# Patient Record
Sex: FEMALE | Race: WHITE | URBAN - METROPOLITAN AREA
[De-identification: names, ages, dates, MRNs, and addresses within clinical notes are randomized per-mention and may not be internally consistent; named-entity substitution may affect disease eponyms.]

---

## 2022-02-08 ENCOUNTER — WEB ENCOUNTER (OUTPATIENT)
Dept: URBAN - METROPOLITAN AREA CLINIC 40 | Facility: CLINIC | Age: 37
End: 2022-02-08

## 2022-02-08 ENCOUNTER — OFFICE VISIT (OUTPATIENT)
Dept: URBAN - METROPOLITAN AREA CLINIC 40 | Facility: CLINIC | Age: 37
End: 2022-02-08
Payer: COMMERCIAL

## 2022-02-08 ENCOUNTER — DASHBOARD ENCOUNTERS (OUTPATIENT)
Age: 37
End: 2022-02-08

## 2022-02-08 VITALS
HEIGHT: 61 IN | DIASTOLIC BLOOD PRESSURE: 84 MMHG | WEIGHT: 160 LBS | TEMPERATURE: 98.6 F | HEART RATE: 93 BPM | BODY MASS INDEX: 30.21 KG/M2 | SYSTOLIC BLOOD PRESSURE: 130 MMHG

## 2022-02-08 DIAGNOSIS — R10.12 LUQ PAIN: ICD-10-CM

## 2022-02-08 DIAGNOSIS — K59.04 CHRONIC IDIOPATHIC CONSTIPATION: ICD-10-CM

## 2022-02-08 DIAGNOSIS — K21.9 GASTROESOPHAGEAL REFLUX DISEASE, UNSPECIFIED WHETHER ESOPHAGITIS PRESENT: ICD-10-CM

## 2022-02-08 DIAGNOSIS — R11.0 NAUSEA: ICD-10-CM

## 2022-02-08 PROBLEM — 235595009: Status: ACTIVE | Noted: 2022-02-08

## 2022-02-08 PROBLEM — 422587007: Status: ACTIVE | Noted: 2022-02-08

## 2022-02-08 PROBLEM — 82934008: Status: ACTIVE | Noted: 2022-02-08

## 2022-02-08 PROCEDURE — 99204 OFFICE O/P NEW MOD 45 MIN: CPT | Performed by: STUDENT IN AN ORGANIZED HEALTH CARE EDUCATION/TRAINING PROGRAM

## 2022-02-08 RX ORDER — LINACLOTIDE 290 UG/1
1 CAPSULE AT LEAST 30 MINUTES BEFORE THE FIRST MEAL OF THE DAY ON AN EMPTY STOMACH CAPSULE, GELATIN COATED ORAL ONCE A DAY
Qty: 90 | Refills: 3 | OUTPATIENT
Start: 2022-02-08 | End: 2023-02-03

## 2022-02-08 RX ORDER — OMEPRAZOLE 20 MG/1
1 CAPSULE 30 MINUTES BEFORE MORNING MEAL CAPSULE, DELAYED RELEASE ORAL ONCE A DAY
Qty: 30 | Refills: 2 | OUTPATIENT
Start: 2022-02-08

## 2022-02-08 NOTE — HPI-TODAY'S VISIT:
37-year-old female [place] Comes in for evaluation of abdomen pain, chronic in nature, ongoing for last many years, mostly in left upper quadrant, no radiation, constant in nature, worsens after meal intake, does not improves after bowel movement. Also reports chronic nausea but no vomiting.  Daily reflux symptoms for which patient is currently not taking any PPI.  Tums only improved symptoms partially.  No dysphagia or odynophagia.  Severe chronic constipation.  Moves her bowels once a week.  No blood in the stool.  Reports straining and hard stools. Has tried over-the-counter laxatives and herbal medications with poor response so far. Reports  Cancer of colon in her cousin at age 22.  She also had colonoscopy few years back which was negative for any obstructive or mass lesions. Never had EGD before. Very rare use of NSAID. No alcohol or smoking. Overall appetite remains good and denies any unintentional weight loss.

## 2022-02-09 LAB
A/G RATIO: 1.6
ALBUMIN: 4.7
ALKALINE PHOSPHATASE: 92
AST (SGOT): 22
BASO (ABSOLUTE): 0
BASOS: 0
BILIRUBIN, TOTAL: 0.7
BUN/CREATININE RATIO: 17
BUN: 11
CALCIUM: 9.6
CARBON DIOXIDE, TOTAL: 23
CHLORIDE: 99
CREATININE: 0.66
EGFR IF AFRICN AM: 131
EGFR IF NONAFRICN AM: 114
EOS (ABSOLUTE): 0.2
EOS: 3
GLOBULIN, TOTAL: 3
GLUCOSE: 105
HEMATOCRIT: 43.6
HEMATOLOGY COMMENTS:: (no result)
HEMOGLOBIN: 14.4
IMMATURE CELLS: (no result)
IMMATURE GRANS (ABS): 0
IMMATURE GRANULOCYTES: 0
LIPASE: 55
LYMPHS (ABSOLUTE): 2.4
LYMPHS: 32
MCH: 28.6
MCHC: 33
MCV: 87
MONOCYTES(ABSOLUTE): 0.5
MONOCYTES: 7
NEUTROPHILS (ABSOLUTE): 4.3
NEUTROPHILS: 58
NRBC: (no result)
PLATELETS: 275
POTASSIUM: 4.1
PROTEIN, TOTAL: 7.7
RBC: 5.03
RDW: 13.1
SODIUM: 137
TSH: 0.64
WBC: 7.4

## 2022-02-22 ENCOUNTER — OFFICE VISIT (OUTPATIENT)
Dept: URBAN - METROPOLITAN AREA CLINIC 73 | Facility: CLINIC | Age: 37
End: 2022-02-22
Payer: COMMERCIAL

## 2022-02-22 DIAGNOSIS — K82.4 GALLBLADDER POLYP: ICD-10-CM

## 2022-02-22 DIAGNOSIS — R10.84 ABDOMINAL PAIN, GENERALIZED: ICD-10-CM

## 2022-02-22 DIAGNOSIS — K76.0 FATTY LIVER: ICD-10-CM

## 2022-02-22 PROCEDURE — 76700 US EXAM ABDOM COMPLETE: CPT | Performed by: STUDENT IN AN ORGANIZED HEALTH CARE EDUCATION/TRAINING PROGRAM

## 2022-02-22 RX ORDER — LINACLOTIDE 290 UG/1
1 CAPSULE AT LEAST 30 MINUTES BEFORE THE FIRST MEAL OF THE DAY ON AN EMPTY STOMACH CAPSULE, GELATIN COATED ORAL ONCE A DAY
Qty: 90 | Refills: 3 | Status: ACTIVE | COMMUNITY
Start: 2022-02-08 | End: 2023-02-03

## 2022-02-22 RX ORDER — OMEPRAZOLE 20 MG/1
1 CAPSULE 30 MINUTES BEFORE MORNING MEAL CAPSULE, DELAYED RELEASE ORAL ONCE A DAY
Qty: 30 | Refills: 2 | Status: ACTIVE | COMMUNITY
Start: 2022-02-08

## 2022-03-24 ENCOUNTER — OFFICE VISIT (OUTPATIENT)
Dept: URBAN - METROPOLITAN AREA SURGERY CENTER 30 | Facility: SURGERY CENTER | Age: 37
End: 2022-03-24

## 2022-03-24 RX ORDER — OMEPRAZOLE 20 MG/1
1 CAPSULE 30 MINUTES BEFORE MORNING MEAL CAPSULE, DELAYED RELEASE ORAL ONCE A DAY
Qty: 30 | Refills: 2 | Status: ACTIVE | COMMUNITY
Start: 2022-02-08

## 2022-03-24 RX ORDER — LINACLOTIDE 290 UG/1
1 CAPSULE AT LEAST 30 MINUTES BEFORE THE FIRST MEAL OF THE DAY ON AN EMPTY STOMACH CAPSULE, GELATIN COATED ORAL ONCE A DAY
Qty: 90 | Refills: 3 | Status: ACTIVE | COMMUNITY
Start: 2022-02-08 | End: 2023-02-03

## 2024-09-25 PROBLEM — 197321007: Status: ACTIVE | Noted: 2024-09-25

## 2024-09-25 PROBLEM — 197433003: Status: ACTIVE | Noted: 2024-09-25

## 2024-10-03 ENCOUNTER — OFFICE VISIT (OUTPATIENT)
Dept: URBAN - METROPOLITAN AREA CLINIC 74 | Facility: CLINIC | Age: 39
End: 2024-10-03
Payer: COMMERCIAL

## 2024-10-03 ENCOUNTER — LAB OUTSIDE AN ENCOUNTER (OUTPATIENT)
Dept: URBAN - METROPOLITAN AREA CLINIC 74 | Facility: CLINIC | Age: 39
End: 2024-10-03

## 2024-10-03 VITALS
BODY MASS INDEX: 28.13 KG/M2 | SYSTOLIC BLOOD PRESSURE: 118 MMHG | HEIGHT: 61 IN | HEART RATE: 59 BPM | OXYGEN SATURATION: 98 % | WEIGHT: 149 LBS | DIASTOLIC BLOOD PRESSURE: 70 MMHG

## 2024-10-03 DIAGNOSIS — K82.4 GALLBLADDER POLYP: ICD-10-CM

## 2024-10-03 DIAGNOSIS — A04.8 H. PYLORI INFECTION: ICD-10-CM

## 2024-10-03 DIAGNOSIS — R14.3 FLATULENCE: ICD-10-CM

## 2024-10-03 DIAGNOSIS — Z87.42 HISTORY OF ENDOMETRIOSIS: ICD-10-CM

## 2024-10-03 DIAGNOSIS — Z90.710 HISTORY OF HYSTERECTOMY: ICD-10-CM

## 2024-10-03 DIAGNOSIS — R14.1 GAS PAIN: ICD-10-CM

## 2024-10-03 DIAGNOSIS — K76.0 FATTY LIVER: ICD-10-CM

## 2024-10-03 DIAGNOSIS — R10.33 PERIUMBILICAL ABDOMINAL PAIN: ICD-10-CM

## 2024-10-03 DIAGNOSIS — R12 HEARTBURN: ICD-10-CM

## 2024-10-03 DIAGNOSIS — K21.9 GASTROESOPHAGEAL REFLUX DISEASE, UNSPECIFIED WHETHER ESOPHAGITIS PRESENT: ICD-10-CM

## 2024-10-03 DIAGNOSIS — Z87.19 HISTORY OF GASTRITIS: ICD-10-CM

## 2024-10-03 DIAGNOSIS — K59.01 SLOW TRANSIT CONSTIPATION: ICD-10-CM

## 2024-10-03 PROBLEM — 161800001: Status: ACTIVE | Noted: 2024-10-03

## 2024-10-03 PROBLEM — 35298007: Status: ACTIVE | Noted: 2024-10-03

## 2024-10-03 PROBLEM — 691501000119103: Status: ACTIVE | Noted: 2024-10-03

## 2024-10-03 PROBLEM — 45979003: Status: ACTIVE | Noted: 2024-10-03

## 2024-10-03 PROBLEM — 271832001: Status: ACTIVE | Noted: 2024-10-03

## 2024-10-03 PROBLEM — 16331000: Status: ACTIVE | Noted: 2024-10-03

## 2024-10-03 PROBLEM — 29200001000004107: Status: ACTIVE | Noted: 2024-10-03

## 2024-10-03 PROBLEM — 721730009: Status: ACTIVE | Noted: 2024-10-03

## 2024-10-03 PROBLEM — 443503005: Status: ACTIVE | Noted: 2024-10-03

## 2024-10-03 PROCEDURE — 99214 OFFICE O/P EST MOD 30 MIN: CPT | Performed by: INTERNAL MEDICINE

## 2024-10-03 RX ORDER — LINACLOTIDE 145 UG/1
1 CAPSULE AT LEAST 30 MINUTES BEFORE THE FIRST MEAL OF THE DAY ON AN EMPTY STOMACH CAPSULE, GELATIN COATED ORAL ONCE A DAY
Qty: 14 | Refills: 0 | OUTPATIENT
Start: 2024-10-03 | End: 2024-10-17

## 2024-10-03 NOTE — HPI-TODAY'S VISIT:
37-year-old female [place] to [place] Comes in for evaluation of abdomen pain, chronic in nature, ongoing for last many years, mostly in left upper quadrant, no radiation, constant in nature, worsens after meal intake, does not improves after bowel movement. Also reports chronic nausea but no vomiting.  Daily reflux symptoms for which patient is currently not taking any PPI.  Tums only improved symptoms partially.  No dysphagia or odynophagia.  Severe chronic constipation.  Moves her bowels once a week.  No blood in the stool.  Reports straining and hard stools. Has tried over-the-counter laxatives and herbal medications with poor response so far. Reports  Cancer of colon in her cousin at age 22.  She also had colonoscopy few years back which was negative for any obstructive or mass lesions. Never had EGD before. Very rare use of NSAID. No alcohol or smoking. Overall appetite remains good and denies any unintentional weight loss.  Today October 3, 2024 the patient returns for a follow-up visit, the patient was last seen on February 8, 2022 by Dr. Hays with left upper quadrant abdominal pain, gastroesophageal reflux disease, nausea and chronic idiopathic constipation,   At the time the patient stated that the pain was chronic in nature ongoing for many years mostly in the left upper quadrant, did not radiate, constant in nature, worsened after meals, did not improve after bowel movements.  The patient had nausea but no vomiting.  The patient had daily reflux symptoms and was taking Tums, did not take PPIs, improved only partially with Tums, the patient denied having dysphagia or odynophagia.    The patient had severe constipation, had a BM once a week, did strain and had hard stools, there was no rectal bleeding.  The patient did try over-the-counter laxatives and herbal medications with good response.  The patient had a cousin had colon cancer at age 22, the patient did have a colonoscopy years before which was negative.  The patient rarely used nonsteroidal anti-inflammatory drugs.  At the time the patient was advised to get an ultrasound of the abdomen, an EGD, start Linzess, high-fiber diet.  The patient was advised to start omeprazole 20 mg before breakfast and follow antireflux measures and diet.  The patient was advised to get a colonoscopy at age 41.  The ultrasound performed on February 22, 2022 revealed a 6.6 mm gallbladder polyp with no gallstones, no biliary ductal dilation, the liver appeared to be moderately echogenic compatible with fatty liver, no focal lesions identified in the contour was smooth.  The right kidney and left kidney were normal the spleen was normal there was no ascites.  There is no evidence that the patient had an EGD.  Today the patient returns to the office with multiple complaints, the patient states that she has continued to have gastroesophageal reflux and heartburn, denies having any nausea or vomiting, denies having any dysphagia or odynophagia.  While in [facility] [place] did have an EGD and was told that she had an H. pylori infection which was treated with various antibiotics and subsequently had an H. pylori breath test which remained positive.  The patient is currently not taking any medications for H. pylori or acid reflux.  The patient was instructed to start antireflux measures and diet, will be scheduled to have an H. pylori breath test off PPIs and will be scheduled to have an EGD with gastric biopsies.  The patient also complains of chronic constipation, while taking Linzess 72 mcg as prescribed by Dr. Hays was defecating more frequently by 6 stools on the [place] scale, she stopped taking the medication and started to take over-the-counter laxatives, when taking laxatives she gets diarrhea.  The patient states that if she does not take a laxative she will not get any natural desire to defecate, once on the laxative she has no difficulty expelling stool out of the rectum, she does not have to strain.  The patient denies having any rectal bleeding or hematochezia.  We will obtain records from her recent colonoscopy performed in [facility], at the time she was told that the colonoscopy was normal.  The patient recently had a hysterectomy to treat endometriosis, she continues to have periumbilical abdominal pain, dull in nature, pretty persistent, does not radiate, does not improve with defecation, urination, physical activity change in position.  The patient will be advised to get a CT scan of the abdomen and pelvis with contrast as long as her renal function allows it.  We are also getting a transit time with markers.  The patient in the past was diagnosed with a gallbladder polyp measuring 6.6 mm, according to the patient she did have a recent ultrasound which failed to show any abnormalities also performed in [facility], we will get a follow-up right upper quadrant ultrasound.  The patient's endocrinologist in [facility] suspected she had hypothyroidism but there are no records from any evaluation.  We will obtain a TSH and free T4.  The patient will return for a follow-up visit after we complete the evaluation.  In the meantime she will start Linzess 145 mcg to be taken daily samples were provided.

## 2024-10-14 LAB
ABSOLUTE BASOPHILS: 53
ABSOLUTE EOSINOPHILS: 147
ABSOLUTE LYMPHOCYTES: 2552
ABSOLUTE MONOCYTES: 504
ABSOLUTE NEUTROPHILS: 7245
ALBUMIN/GLOBULIN RATIO: 1.5
ALBUMIN: 4.3
ALKALINE PHOSPHATASE: 76
ALT: 30
AST: 17
BASOPHILS: 0.5
BILIRUBIN, TOTAL: 1.2
BUN/CREATININE RATIO: (no result)
C-REACTIVE PROTEIN, QUANT: <3
CALCIUM: 9.4
CARBON DIOXIDE: 24
CHLORIDE: 104
CREATININE: 0.63
EGFR: 116
EOSINOPHILS: 1.4
FIB 4 INDEX: 0.44
GLOBULIN: 2.9
GLUCOSE: 94
H PYLORI BREATH TEST: DETECTED
HEMATOCRIT: 44.4
HEMOGLOBIN: 14.2
LYMPHOCYTES: 24.3
MCH: 28.2
MCHC: 32
MCV: 88.3
MONOCYTES: 4.8
MPV: 10.7
NEUTROPHILS: 69
PLATELET COUNT: 268
PLATELET COUNT: 268
POTASSIUM: 4.4
PROTEIN, TOTAL: 7.2
RDW: 12.8
RED BLOOD CELL COUNT: 5.03
SODIUM: 137
TSH W/REFLEX TO FT4: 0.41
UREA NITROGEN (BUN): 16
WHITE BLOOD CELL COUNT: 10.5

## 2024-11-12 ENCOUNTER — CLAIMS CREATED FROM THE CLAIM WINDOW (OUTPATIENT)
Dept: URBAN - METROPOLITAN AREA SURGERY CENTER 30 | Facility: SURGERY CENTER | Age: 39
End: 2024-11-12
Payer: COMMERCIAL

## 2024-11-12 DIAGNOSIS — K29.60 ADENOPAPILLOMATOSIS GASTRICA: ICD-10-CM

## 2024-11-12 DIAGNOSIS — R12 BURNING REFLUX: ICD-10-CM

## 2024-11-12 DIAGNOSIS — K29.70 CHRONIC GASTRITIS: ICD-10-CM

## 2024-11-12 PROCEDURE — 43239 EGD BIOPSY SINGLE/MULTIPLE: CPT | Performed by: INTERNAL MEDICINE

## 2024-11-12 PROCEDURE — 00731 ANES UPR GI NDSC PX NOS: CPT | Performed by: NURSE ANESTHETIST, CERTIFIED REGISTERED

## 2024-12-11 PROBLEM — 266435005: Status: ACTIVE | Noted: 2024-12-11

## 2024-12-11 PROBLEM — 84089009: Status: ACTIVE | Noted: 2024-12-11

## 2024-12-11 PROBLEM — 8493009: Status: ACTIVE | Noted: 2024-12-11

## 2024-12-19 ENCOUNTER — OFFICE VISIT (OUTPATIENT)
Dept: URBAN - METROPOLITAN AREA CLINIC 74 | Facility: CLINIC | Age: 39
End: 2024-12-19
Payer: COMMERCIAL

## 2024-12-19 ENCOUNTER — LAB OUTSIDE AN ENCOUNTER (OUTPATIENT)
Dept: URBAN - METROPOLITAN AREA CLINIC 74 | Facility: CLINIC | Age: 39
End: 2024-12-19

## 2024-12-19 VITALS
WEIGHT: 150 LBS | BODY MASS INDEX: 28.32 KG/M2 | HEART RATE: 75 BPM | OXYGEN SATURATION: 99 % | SYSTOLIC BLOOD PRESSURE: 118 MMHG | DIASTOLIC BLOOD PRESSURE: 70 MMHG | HEIGHT: 61 IN

## 2024-12-19 DIAGNOSIS — K76.0 FATTY LIVER DISEASE, NONALCOHOLIC: ICD-10-CM

## 2024-12-19 DIAGNOSIS — K21.9 GERD WITHOUT ESOPHAGITIS: ICD-10-CM

## 2024-12-19 DIAGNOSIS — Z87.19 HISTORY OF GASTRITIS: ICD-10-CM

## 2024-12-19 DIAGNOSIS — N83.8 OVARIAN MASS, LEFT: ICD-10-CM

## 2024-12-19 DIAGNOSIS — K44.9 HIATAL HERNIA: ICD-10-CM

## 2024-12-19 DIAGNOSIS — Z90.710 HISTORY OF HYSTERECTOMY: ICD-10-CM

## 2024-12-19 DIAGNOSIS — K29.50 MILD CHRONIC GASTRITIS: ICD-10-CM

## 2024-12-19 DIAGNOSIS — K59.01 SLOW TRANSIT CONSTIPATION: ICD-10-CM

## 2024-12-19 DIAGNOSIS — N83.202 CYST OF LEFT OVARY: ICD-10-CM

## 2024-12-19 DIAGNOSIS — K82.4 GALLBLADDER POLYP: ICD-10-CM

## 2024-12-19 DIAGNOSIS — A04.8 POSITIVE H. PYLORI TEST: ICD-10-CM

## 2024-12-19 DIAGNOSIS — Z87.42 HISTORY OF ENDOMETRIOSIS: ICD-10-CM

## 2024-12-19 DIAGNOSIS — R10.33 PERIUMBILICAL ABDOMINAL PAIN: ICD-10-CM

## 2024-12-19 PROBLEM — 79883001: Status: ACTIVE | Noted: 2024-12-19

## 2024-12-19 PROBLEM — 1231824009: Status: ACTIVE | Noted: 2024-12-19

## 2024-12-19 PROBLEM — 441773004: Status: ACTIVE | Noted: 2024-12-19

## 2024-12-19 PROBLEM — 15634751000119101: Status: ACTIVE | Noted: 2024-12-19

## 2024-12-19 PROCEDURE — 99214 OFFICE O/P EST MOD 30 MIN: CPT | Performed by: INTERNAL MEDICINE

## 2024-12-19 RX ORDER — LINACLOTIDE 145 UG/1
1 CAPSULE AT LEAST 30 MINUTES BEFORE THE FIRST MEAL OF THE DAY ON AN EMPTY STOMACH CAPSULE, GELATIN COATED ORAL ONCE A DAY
Qty: 90 | Refills: 3
Start: 2024-10-03 | End: 2025-12-14

## 2024-12-19 RX ORDER — AMOXICILLIN AND CLAVULANATE POTASSIUM 875; 125 MG/1; MG/1
TABLET, FILM COATED ORAL
Qty: 20 TABLET | Status: ACTIVE | COMMUNITY

## 2024-12-19 NOTE — HPI-TODAY'S VISIT:
37-year-old female New to me Comes in for evaluation of abdomen pain, chronic in nature, ongoing for last many years, mostly in left upper quadrant, no radiation, constant in nature, worsens after meal intake, does not improves after bowel movement. Also reports chronic nausea but no vomiting.  Daily reflux symptoms for which patient is currently not taking any PPI.  Tums only improved symptoms partially.  No dysphagia or odynophagia.  Severe chronic constipation.  Moves her bowels once a week.  No blood in the stool.  Reports straining and hard stools. Has tried over-the-counter laxatives and herbal medications with poor response so far. Reports  Cancer of colon in her cousin at age 22.  She also had colonoscopy few years back which was negative for any obstructive or mass lesions. Never had EGD before. Very rare use of NSAID. No alcohol or smoking. Overall appetite remains good and denies any unintentional weight loss.  Today October 3, 2024 the patient returns for a follow-up visit, the patient was last seen on February 8, 2022 by Dr. Hays with left upper quadrant abdominal pain, gastroesophageal reflux disease, nausea and chronic idiopathic constipation,   At the time the patient stated that the pain was chronic in nature ongoing for many years mostly in the left upper quadrant, did not radiate, constant in nature, worsened after meals, did not improve after bowel movements.  The patient had nausea but no vomiting.  The patient had daily reflux symptoms and was taking Tums, did not take PPIs, improved only partially with Tums, the patient denied having dysphagia or odynophagia.    The patient had severe constipation, had a BM once a week, did strain and had hard stools, there was no rectal bleeding.  The patient did try over-the-counter laxatives and herbal medications with good response.  The patient had a cousin had colon cancer at age 22, the patient did have a colonoscopy years before which was negative.  The patient rarely used nonsteroidal anti-inflammatory drugs.  At the time the patient was advised to get an ultrasound of the abdomen, an EGD, start Linzess, high-fiber diet.  The patient was advised to start omeprazole 20 mg before breakfast and follow antireflux measures and diet.  The patient was advised to get a colonoscopy at age 41.  The ultrasound performed on February 22, 2022 revealed a 6.6 mm gallbladder polyp with no gallstones, no biliary ductal dilation, the liver appeared to be moderately echogenic compatible with fatty liver, no focal lesions identified in the contour was smooth.  The right kidney and left kidney were normal the spleen was normal there was no ascites.  There is no evidence that the patient had an EGD.  Today the patient returns to the office with multiple complaints, the patient states that she has continued to have gastroesophageal reflux and heartburn, denies having any nausea or vomiting, denies having any dysphagia or odynophagia.  While in [facility] [place] did have an EGD and was told that she had an H. pylori infection which was treated with various antibiotics and subsequently had an H. pylori breath test which remained positive.  The patient is currently not taking any medications for H. pylori or acid reflux.  The patient was instructed to start antireflux measures and diet, will be scheduled to have an H. pylori breath test off PPIs and will be scheduled to have an EGD with gastric biopsies.  The patient also complains of chronic constipation, while taking Linzess 72 mcg as prescribed by Dr. Hays was defecating more frequently by 6 stools on the [place] scale, she stopped taking the medication and started to take over-the-counter laxatives, when taking laxatives she gets diarrhea.  The patient states that if she does not take a laxative she will not get any natural desire to defecate, once on the laxative she has no difficulty expelling stool out of the rectum, she does not have to strain.  The patient denies having any rectal bleeding or hematochezia.  We will obtain records from her recent colonoscopy performed in [facility], at the time she was told that the colonoscopy was normal.  The patient recently had a hysterectomy to treat endometriosis, she continues to have periumbilical abdominal pain, dull in nature, pretty persistent, does not radiate, does not improve with defecation, urination, physical activity change in position.  The patient will be advised to get a CT scan of the abdomen and pelvis with contrast as long as her renal function allows it.  We are also getting a transit time with markers.  The patient in the past was diagnosed with a gallbladder polyp measuring 6.6 mm, according to the patient she did have a recent ultrasound which failed to show any abnormalities also performed in [facility], we will get a follow-up right upper quadrant ultrasound.  The patient's endocrinologist in [facility] suspected she had hypothyroidism but there are no records from any evaluation.  We will obtain a TSH and free T4.  The patient will return for a follow-up visit after we complete the evaluation.  In the meantime she will start Linzess 145 mcg to be taken daily samples were provided.  Today December 19, 2024 the patient returns for a follow-up visit, the patient was last seen on October 3, 2024 with gastroesophageal reflux, heartburn, history of gastritis, history of H. pylori infection, periumbilical abdominal pain, flatulence, gas pain and slow transit constipation, fatty liver, gallbladder polyp, history of hysterectomy due to endometriosis.  At the time of the visit the patient presented with multiple complaints including acid reflux and heartburn, denied having nausea vomiting dysphagia or odynophagia, the patient stated that she had an EGD in [place] and told that she had an H. pylori infection and was treated with various antibiotics and subsequently had an H. pylori breath test which remained positive.  At the time of the visit she was not taking any medications for acid reflux or H. pylori.  At the time of the visit the patient was advised to follow antireflux measures and diet, be scheduled for H. pylori breath test off PPIs and schedule an EGD with gastric biopsies.  The patient also complained of constipation, Dr. Hays had prescribed Linzess 72 mcg and at the time appeared to be defecating more frequently type VI stools on the [place] scale, the patient did stop the medication and replaced it with over-the-counter laxatives which caused diarrhea.  At the time of the visit no longer was taking any laxatives, the patient stated that she would not get the desired to defecate and when she did after taking a laxative she had an easy bowel movement.  Denies having any outlet dysfunction.  The patient denied having any rectal bleeding or hematochezia.  The patient stated that she also had a colonoscopy in [facility] and was told it was normal.  The patient was post hysterectomy for endometriosis and did complain of periumbilical abdominal pain, it did not improve with defecation urination change in position or physical activity.  The patient was advised to get a CT scan of the abdomen and pelvis as well as a transit time with markers.  The patient in the past had been diagnosed with a gallbladder polyp measuring 6.6 mm but stated that the recent ultrasound failed to show any abnormalities.  She was told that she probably has hypothyroidism while being [facility].  We ordered the TSH and T4.  In the meantime the patient was advised to start treatment with Linzess 145 mcg daily.  Laboratory on October 3, 2024 revealed an ALT of 30 with a high normal of 29 otherwise normal LFTs and a normal fib 4 index of 0.44.  The patient had a normal CBC with a white cell count of 10.5, hemoglobin 14.2 with a hematocrit of 44.4, normal differential and indices.  The TSH was 0.41 which is normal.  The patient had a positive H. pylori breath test again on October 14, 2024 the patient CRP was under 3.0.  The transit time with markers revealed fecal loading within the colon most prominent within the ascending and transverse colon, markers were seen within the left upper quadrant, there was no obstructive bowel gas pattern, study was compatible with constipation.  Right upper quadrant ultrasound revealed changes suggestive of hepatic steatosis, gallbladder revealed a 7 mm polyp unchanged, there was no wall thickening or pericholecystic fluid, no gallstones, bile ducts were normal, portions of the pancreas visualized appeared to be normal, the right kidney, aorta and IVC appear to be normal there was no ascites.  The upper endoscopy performed November 12, 2024 revealed a small hiatal hernia, normal esophageal looking mucosa.  Normal duodenal mucosa.  Biopsies revealed normal duodenal mucosa, chronic gastritis with reactive changes, H. pylori organisms not identified, esophageal biopsies failed to show any abnormalities.  Today the patient returns to the office stating that as long as she took Linzess at 145 mcg daily she was having type IV stools on the [place] scale on a daily basis.  Stools did not have any blood, having regular bowel movements improved the abdominal discomfort and gas pain.  I discussed with the patient the transit time which revealed increased stool on the right colon with delay in passage of the markers.  The patient agreed to provide us with the report of the colonoscopy performed in [place], she will also provide copies of the EGD and gastric biopsies.  The patient's H. pylori breath test came back positive but the EGD revealed a small hiatal hernia, mild chronic gastritis H. pylori negative, there was not even a hint of H. pylori on the biopsies.  In view of the above the patient will repeat the H. pylori breath test 1 month after she completes therapy with amoxicillin for an upper respiratory infection.  She will avoid PPIs for at least 2 weeks before the testing.  I discussed with the patient the presence of the gallbladder polyp which will continue to be followed with an ultrasound in 1 year.  The patient has been advised to be seen by GYN for the left ovarian cyst and mass.  The patient has also been advised to follow Mediterranean diet and lose 10% of body weight to correct fatty liver.  The periumbilical pain appears to be mostly muscle pain which she will then further discuss with primary care.  The patient will return for a follow-up visit in 3 months or as needed.